# Patient Record
Sex: MALE | Race: ASIAN | NOT HISPANIC OR LATINO | URBAN - METROPOLITAN AREA
[De-identification: names, ages, dates, MRNs, and addresses within clinical notes are randomized per-mention and may not be internally consistent; named-entity substitution may affect disease eponyms.]

---

## 2017-01-09 ENCOUNTER — CHARTING TRANS (OUTPATIENT)
Dept: INTERNAL MEDICINE | Age: 22
End: 2017-01-09

## 2017-01-10 ENCOUNTER — LAB SERVICES (OUTPATIENT)
Dept: OTHER | Age: 22
End: 2017-01-10

## 2017-01-10 LAB
ALBUMIN SERPL BCG-MCNC: 4.8 G/DL (ref 3.6–5.1)
ALP SERPL-CCNC: 86 U/L (ref 30–130)
ALT SERPL W/O P-5'-P-CCNC: 18 U/L (ref 10–35)
AST SERPL-CCNC: 23 U/L (ref 9–37)
BILIRUB SERPL-MCNC: 0.6 MG/DL (ref 0–1)
BUN SERPL-MCNC: 10 MG/DL (ref 6–27)
CALCIUM SERPL-MCNC: 10.1 MG/DL (ref 8.6–10.6)
CHLORIDE SERPL-SCNC: 99 MMOL/L (ref 96–107)
CHOLEST SERPL-MCNC: 138 MG/DL
CREATININE, SERUM: 1 MG/DL (ref 0.6–1.6)
DIFFERENTIAL TYPE: ABNORMAL
GFR SERPL CREATININE-BSD FRML MDRD: >60 ML/MIN/{1.73M2}
GFR SERPL CREATININE-BSD FRML MDRD: >60 ML/MIN/{1.73M2}
GLUCOSE P FAST SERPL-MCNC: 94 MG/DL (ref 60–100)
HCO3 SERPL-SCNC: 23 MMOL/L (ref 22–32)
HDLC SERPL-MCNC: 44 MG/DL
HEMATOCRIT: 42.4 % (ref 40–51)
HEMOGLOBIN: 14.1 G/DL (ref 13.7–17.5)
LDLC SERPL CALC-MCNC: 75 MG/DL
LYMPH PERCENT: 12.3 % (ref 20.5–51.1)
LYMPHOCYTE ABSOLUTE #: 1.2 10*3/UL (ref 1.2–3.4)
MEAN CORPUSCULAR HGB CONCENTRATION: 33.3 % (ref 32–36)
MEAN CORPUSCULAR HGB: 27.4 PG (ref 27–34)
MEAN CORPUSCULAR VOLUME: 82.5 FL (ref 79–95)
MEAN PLATELET VOLUME: 8.4 FL (ref 8.6–12.4)
MIXED %: 14.1 % (ref 4.3–12.9)
MIXED ABSOLUTE #: 1.3 10*3/UL (ref 0.2–0.9)
NEUTROPHIL ABSOLUTE #: 6.9 10*3/UL (ref 1.4–6.5)
NEUTROPHIL PERCENT: 73.6 % (ref 34–73.5)
PLATELET COUNT: 307 10*3/UL (ref 150–400)
POTASSIUM SERPL-SCNC: 4.5 MMOL/L (ref 3.5–5.3)
PROT SERPL-MCNC: 8.6 G/DL (ref 6.2–8.1)
RED BLOOD CELL COUNT: 5.14 10*6/UL (ref 3.9–5.7)
RED CELL DISTRIBUTION WIDTH: 12.8 % (ref 11.3–14.8)
SODIUM SERPL-SCNC: 136 MMOL/L (ref 136–146)
TRIGL SERPL-MCNC: 91 MG/DL (ref 0–90)
TSH SERPL DL<=0.05 MIU/L-ACNC: 3.76 M[IU]/L (ref 0.3–4.82)
WHITE BLOOD CELL COUNT: 9.4 10*3/UL (ref 4–10)

## 2017-02-10 ENCOUNTER — CHARTING TRANS (OUTPATIENT)
Dept: OTHER | Age: 22
End: 2017-02-10

## 2017-09-29 ENCOUNTER — LAB SERVICES (OUTPATIENT)
Dept: OTHER | Age: 22
End: 2017-09-29

## 2017-09-29 ENCOUNTER — IMAGING SERVICES (OUTPATIENT)
Dept: OTHER | Age: 22
End: 2017-09-29

## 2017-09-29 ENCOUNTER — CHARTING TRANS (OUTPATIENT)
Dept: OTHER | Age: 22
End: 2017-09-29

## 2017-09-29 ENCOUNTER — CHARTING TRANS (OUTPATIENT)
Dept: INTERNAL MEDICINE | Age: 22
End: 2017-09-29

## 2017-09-29 LAB
BUN SERPL-MCNC: 10 MG/DL (ref 6–27)
CALCIUM SERPL-MCNC: 9.4 MG/DL (ref 8.6–10.6)
CHLORIDE SERPL-SCNC: 98 MMOL/L (ref 96–107)
CREATININE, SERUM: 0.9 MG/DL (ref 0.6–1.6)
DIFFERENTIAL TYPE: ABNORMAL
GFR SERPL CREATININE-BSD FRML MDRD: >60 ML/MIN/{1.73M2}
GFR SERPL CREATININE-BSD FRML MDRD: >60 ML/MIN/{1.73M2}
GLUCOSE SERPL-MCNC: 96 MG/DL (ref 70–200)
HCO3 SERPL-SCNC: 25 MMOL/L (ref 22–32)
HEMATOCRIT: 39 % (ref 40–51)
HEMOGLOBIN: 12.4 G/DL (ref 13.7–17.5)
LYMPH PERCENT: 8.6 % (ref 20.5–51.1)
LYMPHOCYTE ABSOLUTE #: 1 10*3/UL (ref 1.2–3.4)
MAGNESIUM SERPL-MCNC: 2.1 MG/DL (ref 1.6–2.6)
MEAN CORPUSCULAR HGB CONCENTRATION: 31.8 % (ref 32–36)
MEAN CORPUSCULAR HGB: 25.7 PG (ref 27–34)
MEAN CORPUSCULAR VOLUME: 80.9 FL (ref 79–95)
MEAN PLATELET VOLUME: 8.1 FL (ref 8.6–12.4)
MIXED %: 13.2 % (ref 4.3–12.9)
MIXED ABSOLUTE #: 1.6 10*3/UL (ref 0.2–0.9)
NEUTROPHIL ABSOLUTE #: 9.2 10*3/UL (ref 1.4–6.5)
NEUTROPHIL PERCENT: 78.2 % (ref 34–73.5)
PLATELET COUNT: 363 10*3/UL (ref 150–400)
POTASSIUM SERPL-SCNC: 4.3 MMOL/L (ref 3.5–5.3)
RED BLOOD CELL COUNT: 4.82 10*6/UL (ref 3.9–5.7)
RED CELL DISTRIBUTION WIDTH: 13.5 % (ref 11.3–14.8)
SODIUM SERPL-SCNC: 135 MMOL/L (ref 136–146)
TSH SERPL DL<=0.05 MIU/L-ACNC: 2.38 M[IU]/L (ref 0.3–4.82)
WHITE BLOOD CELL COUNT: 11.8 10*3/UL (ref 4–10)

## 2017-10-03 ENCOUNTER — LAB SERVICES (OUTPATIENT)
Dept: OTHER | Age: 22
End: 2017-10-03

## 2017-10-03 ENCOUNTER — CHARTING TRANS (OUTPATIENT)
Dept: OTHER | Age: 22
End: 2017-10-03

## 2017-10-04 LAB
FERRITIN SERPL-MCNC: 273 NG/ML (ref 18–464)
IRON SERPL-MCNC: 32 UG/DL (ref 49–181)

## 2017-10-09 ENCOUNTER — IMAGING SERVICES (OUTPATIENT)
Dept: OTHER | Age: 22
End: 2017-10-09

## 2017-10-09 ENCOUNTER — LAB SERVICES (OUTPATIENT)
Dept: OTHER | Age: 22
End: 2017-10-09

## 2017-10-09 ENCOUNTER — CHARTING TRANS (OUTPATIENT)
Dept: INTERNAL MEDICINE | Age: 22
End: 2017-10-09

## 2017-10-09 ENCOUNTER — CHARTING TRANS (OUTPATIENT)
Dept: OTHER | Age: 22
End: 2017-10-09

## 2017-10-09 LAB
DIFFERENTIAL TYPE: ABNORMAL
HEMATOCRIT: 38.9 % (ref 40–51)
HEMOGLOBIN: 12.5 G/DL (ref 13.7–17.5)
LYMPH PERCENT: 7.3 % (ref 20.5–51.1)
LYMPHOCYTE ABSOLUTE #: 0.8 10*3/UL (ref 1.2–3.4)
MEAN CORPUSCULAR HGB CONCENTRATION: 32.1 % (ref 32–36)
MEAN CORPUSCULAR HGB: 25.6 PG (ref 27–34)
MEAN CORPUSCULAR VOLUME: 79.7 FL (ref 79–95)
MEAN PLATELET VOLUME: 8.4 FL (ref 8.6–12.4)
MIXED %: 13.9 % (ref 4.3–12.9)
MIXED ABSOLUTE #: 1.6 10*3/UL (ref 0.2–0.9)
NEUTROPHIL ABSOLUTE #: 8.9 10*3/UL (ref 1.4–6.5)
NEUTROPHIL PERCENT: 78.8 % (ref 34–73.5)
PLATELET COUNT: 411 10*3/UL (ref 150–400)
RED BLOOD CELL COUNT: 4.88 10*6/UL (ref 3.9–5.7)
RED CELL DISTRIBUTION WIDTH: 13.3 % (ref 11.3–14.8)
WHITE BLOOD CELL COUNT: 11.3 10*3/UL (ref 4–10)

## 2017-10-10 ENCOUNTER — CHARTING TRANS (OUTPATIENT)
Dept: OTHER | Age: 22
End: 2017-10-10

## 2017-10-11 ENCOUNTER — CHARTING TRANS (OUTPATIENT)
Dept: OTHER | Age: 22
End: 2017-10-11

## 2018-11-28 VITALS
TEMPERATURE: 99.8 F | RESPIRATION RATE: 16 BRPM | BODY MASS INDEX: 17.83 KG/M2 | DIASTOLIC BLOOD PRESSURE: 60 MMHG | WEIGHT: 107 LBS | SYSTOLIC BLOOD PRESSURE: 90 MMHG | HEIGHT: 65 IN | HEART RATE: 134 BPM

## 2018-11-28 VITALS
TEMPERATURE: 98.2 F | HEART RATE: 115 BPM | SYSTOLIC BLOOD PRESSURE: 90 MMHG | DIASTOLIC BLOOD PRESSURE: 60 MMHG | WEIGHT: 105 LBS | HEIGHT: 65 IN | OXYGEN SATURATION: 95 % | BODY MASS INDEX: 17.49 KG/M2

## 2018-11-29 VITALS
BODY MASS INDEX: 19.99 KG/M2 | HEART RATE: 116 BPM | HEIGHT: 65 IN | SYSTOLIC BLOOD PRESSURE: 122 MMHG | DIASTOLIC BLOOD PRESSURE: 60 MMHG | TEMPERATURE: 98.5 F | WEIGHT: 120 LBS

## 2019-07-24 ENCOUNTER — HOSPITAL ENCOUNTER (EMERGENCY)
Facility: HOSPITAL | Age: 24
Discharge: HOME | End: 2019-07-24
Payer: COMMERCIAL

## 2019-07-24 VITALS
RESPIRATION RATE: 20 BRPM | DIASTOLIC BLOOD PRESSURE: 81 MMHG | OXYGEN SATURATION: 95 % | TEMPERATURE: 98 F | HEART RATE: 97 BPM | SYSTOLIC BLOOD PRESSURE: 133 MMHG | BODY MASS INDEX: 24.11 KG/M2 | WEIGHT: 150 LBS | HEIGHT: 66 IN

## 2019-07-24 DIAGNOSIS — S21.95XA DOG BITE OF TRUNK, INITIAL ENCOUNTER: Primary | ICD-10-CM
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
## 2019-07-24 PROCEDURE — 99283 EMERGENCY DEPT VISIT LOW MDM: CPT

## 2019-07-24 PROCEDURE — 12001 RPR S/N/AX/GEN/TRNK 2.5CM/<: CPT

## 2019-07-24 PROCEDURE — 90471 IMMUNIZATION ADMIN: CPT

## 2019-07-24 RX ORDER — AMOXICILLIN AND CLAVULANATE POTASSIUM 875; 125 MG/1; MG/1
1 TABLET, FILM COATED ORAL 2 TIMES DAILY
Qty: 14 TABLET | Refills: 0 | Status: SHIPPED | OUTPATIENT
Start: 2019-07-24 | End: 2019-07-31

## 2019-07-24 RX ORDER — AMOXICILLIN AND CLAVULANATE POTASSIUM 875; 125 MG/1; MG/1
1 TABLET, FILM COATED ORAL ONCE
Status: COMPLETED | OUTPATIENT
Start: 2019-07-24 | End: 2019-07-24

## 2019-07-24 RX ORDER — IBUPROFEN 600 MG/1
600 TABLET ORAL ONCE
Status: COMPLETED | OUTPATIENT
Start: 2019-07-24 | End: 2019-07-24

## 2019-07-24 RX ORDER — GARLIC EXTRACT 500 MG
1 CAPSULE ORAL DAILY
Qty: 30 CAPSULE | Refills: 0 | Status: SHIPPED | OUTPATIENT
Start: 2019-07-24

## 2019-07-25 NOTE — ED PROVIDER NOTES
Patient Seen in: [facility] Emergency Department    History   Patient presents with:  Bite (integumentary)    Stated Complaint: dog bite to R side near flank area    HPI    This healthy 57-year-old with normal immunizations is in the emergency depar debris, without any other wounds throughout.     Head is normocephalic and atraumatic      Neck without mass, with normal range of motion      Good peripheral color      Speaking in full sentences without increased work of breathing      Extremities nonedem Discharge Medication List    START taking these medications    Amoxicillin-Pot Clavulanate 875-125 MG Oral Tab  Take 1 tablet by mouth 2 (two) times daily for 7 days.   Qty: 14 tablet Refills: 0

## 2019-08-02 ENCOUNTER — HOSPITAL ENCOUNTER (EMERGENCY)
Facility: HOSPITAL | Age: 24
Discharge: HOME | End: 2019-08-02
Attending: PEDIATRICS
Payer: COMMERCIAL

## 2019-08-02 VITALS
DIASTOLIC BLOOD PRESSURE: 69 MMHG | WEIGHT: 156.5 LBS | SYSTOLIC BLOOD PRESSURE: 112 MMHG | RESPIRATION RATE: 16 BRPM | BODY MASS INDEX: 25 KG/M2 | OXYGEN SATURATION: 100 % | HEART RATE: 60 BPM | TEMPERATURE: 98 F

## 2019-08-02 DIAGNOSIS — Z48.02 ENCOUNTER FOR STAPLE REMOVAL: Primary | ICD-10-CM

## 2019-08-02 NOTE — ED PROVIDER NOTES
Patient Seen in: [facility] Emergency Department    History   Patient presents with:  Albaro Welch (ingtegumentary)    Stated Complaint: suture removal    HPI    Almost 62-year-old male to ER for staple removal placed for dog bite and right heal by secondary intention and to keep clean and apply bacitracin. Patient agrees to plan.               Disposition and Plan     Clinical Impression:  Encounter for staple removal  (primary encounter diagnosis)    Disposition:  Discharge  8/2/2019  4:53

## 2022-12-11 ENCOUNTER — WALK IN (OUTPATIENT)
Dept: URGENT CARE | Age: 27
End: 2022-12-11

## 2022-12-11 VITALS
SYSTOLIC BLOOD PRESSURE: 113 MMHG | DIASTOLIC BLOOD PRESSURE: 68 MMHG | TEMPERATURE: 100.2 F | HEART RATE: 124 BPM | RESPIRATION RATE: 18 BRPM | OXYGEN SATURATION: 94 %

## 2022-12-11 DIAGNOSIS — J02.9 SORE THROAT: ICD-10-CM

## 2022-12-11 DIAGNOSIS — U07.1 COVID-19 VIRUS INFECTION: Primary | ICD-10-CM

## 2022-12-11 LAB
FLUAV AG UPPER RESP QL IA.RAPID: NEGATIVE
FLUBV AG UPPER RESP QL IA.RAPID: NEGATIVE
INTERNAL PROCEDURAL CONTROLS ACCEPTABLE: YES
S PYO AG THROAT QL IA.RAPID: NEGATIVE
SARS-COV+SARS-COV-2 AG RESP QL IA.RAPID: NOT DETECTED
TEST LOT EXPIRATION DATE: NORMAL
TEST LOT EXPIRATION DATE: NORMAL
TEST LOT NUMBER: NORMAL
TEST LOT NUMBER: NORMAL

## 2022-12-11 PROCEDURE — 87880 STREP A ASSAY W/OPTIC: CPT | Performed by: FAMILY MEDICINE

## 2022-12-11 PROCEDURE — 87428 SARSCOV & INF VIR A&B AG IA: CPT | Performed by: FAMILY MEDICINE

## 2022-12-11 PROCEDURE — 99214 OFFICE O/P EST MOD 30 MIN: CPT | Performed by: FAMILY MEDICINE

## (undated) NOTE — ED AVS SNAPSHOT
Brissa Cartwright   MRN: [id number]    Department:  [facility] Emergency Department   Date of Visit:  8/2/2019           Disclosure     Insurance plans vary and the physician(s) referred by the ER may not be covered by your plan.  Please contact your tell this physician (or your personal doctor if your instructions are to return to your personal doctor) about any new or lasting problems. The primary care or specialist physician will see patients referred from the [facility] Emergency Department.  Jacques Peter

## (undated) NOTE — ED AVS SNAPSHOT
Brissa Cartwright   MRN: [id number]    Department:  [facility] Emergency Department   Date of Visit:  7/24/2019           Disclosure     Insurance plans vary and the physician(s) referred by the ER may not be covered by your plan.  Please contact your tell this physician (or your personal doctor if your instructions are to return to your personal doctor) about any new or lasting problems. The primary care or specialist physician will see patients referred from the [facility] Emergency Department.  Melissa Baird